# Patient Record
Sex: MALE | Race: WHITE | NOT HISPANIC OR LATINO | Employment: FULL TIME | URBAN - METROPOLITAN AREA
[De-identification: names, ages, dates, MRNs, and addresses within clinical notes are randomized per-mention and may not be internally consistent; named-entity substitution may affect disease eponyms.]

---

## 2017-02-12 ENCOUNTER — HOSPITAL ENCOUNTER (EMERGENCY)
Facility: HOSPITAL | Age: 37
Discharge: HOME | End: 2017-02-12
Attending: EMERGENCY MEDICINE | Admitting: EMERGENCY MEDICINE
Payer: COMMERCIAL

## 2017-02-12 ENCOUNTER — HOSPITAL ENCOUNTER (EMERGENCY)
Facility: HOSPITAL | Age: 37
Discharge: HOME | End: 2017-02-12
Attending: EMERGENCY MEDICINE
Payer: COMMERCIAL

## 2017-02-12 ENCOUNTER — APPOINTMENT (EMERGENCY)
Dept: RADIOLOGY | Facility: HOSPITAL | Age: 37
End: 2017-02-12
Payer: COMMERCIAL

## 2017-02-12 ENCOUNTER — APPOINTMENT (EMERGENCY)
Dept: CT IMAGING | Facility: HOSPITAL | Age: 37
End: 2017-02-12
Payer: COMMERCIAL

## 2017-02-12 VITALS
TEMPERATURE: 99.4 F | SYSTOLIC BLOOD PRESSURE: 130 MMHG | DIASTOLIC BLOOD PRESSURE: 77 MMHG | WEIGHT: 168.87 LBS | OXYGEN SATURATION: 98 % | RESPIRATION RATE: 16 BRPM | HEART RATE: 101 BPM

## 2017-02-12 VITALS
HEART RATE: 68 BPM | SYSTOLIC BLOOD PRESSURE: 139 MMHG | WEIGHT: 168.87 LBS | OXYGEN SATURATION: 98 % | DIASTOLIC BLOOD PRESSURE: 87 MMHG | RESPIRATION RATE: 16 BRPM | TEMPERATURE: 98 F

## 2017-02-12 DIAGNOSIS — M54.6 THORACIC BACK PAIN: Primary | ICD-10-CM

## 2017-02-12 DIAGNOSIS — M54.6 ACUTE THORACIC BACK PAIN: Primary | ICD-10-CM

## 2017-02-12 PROCEDURE — 99283 EMERGENCY DEPT VISIT LOW MDM: CPT

## 2017-02-12 PROCEDURE — 72070 X-RAY EXAM THORAC SPINE 2VWS: CPT

## 2017-02-12 PROCEDURE — 72128 CT CHEST SPINE W/O DYE: CPT

## 2017-02-12 RX ORDER — NAPROXEN 500 MG/1
500 TABLET ORAL 2 TIMES DAILY WITH MEALS
Qty: 20 TABLET | Refills: 0 | Status: SHIPPED | OUTPATIENT
Start: 2017-02-12 | End: 2017-03-14

## 2017-02-12 RX ORDER — METHOCARBAMOL 750 MG/1
750 TABLET, FILM COATED ORAL 3 TIMES DAILY
Qty: 15 TABLET | Refills: 0 | Status: SHIPPED | OUTPATIENT
Start: 2017-02-12 | End: 2017-02-17

## 2017-10-11 ENCOUNTER — HOSPITAL ENCOUNTER (EMERGENCY)
Facility: HOSPITAL | Age: 37
Discharge: HOME | End: 2017-10-11
Attending: EMERGENCY MEDICINE | Admitting: EMERGENCY MEDICINE
Payer: COMMERCIAL

## 2017-10-11 VITALS
TEMPERATURE: 98.1 F | RESPIRATION RATE: 18 BRPM | SYSTOLIC BLOOD PRESSURE: 141 MMHG | HEART RATE: 100 BPM | DIASTOLIC BLOOD PRESSURE: 87 MMHG | OXYGEN SATURATION: 100 %

## 2017-10-11 DIAGNOSIS — T16.2XXA FOREIGN BODY OF LEFT EAR, INITIAL ENCOUNTER: Primary | ICD-10-CM

## 2017-10-11 PROCEDURE — 99282 EMERGENCY DEPT VISIT SF MDM: CPT

## 2017-10-11 NOTE — ED PROVIDER NOTES
History  Chief Complaint   Patient presents with    Foreign Body in Ear     pt was cleaning out l/ear yesterday w/a plastic syringe, there was a black rubber piece on the tip of it;  pt was shooting up previously w/that syringe and did not come in yesterday b/c he was high; pt having L/ear pain @this time     52-year-old male presents with chief complaint of foreign body in his left ear  Patient had some irritation so he used a syringe that he usually shoots up with and pushed the plunger into his left ear  The rubber portion of the plunger remained in his ear  This occurred yesterday  Patient now with ear pressure  No drainage  No decreased hearing  History provided by:  Patient   used: No    Foreign Body in Ear   Location:  L ear  Suspected object: rubber portion of plunger  Pain quality:  Aching and throbbing  Pain severity:  Mild  Duration:  1 day  Timing:  Constant  Progression:  Unchanged  Chronicity:  New  Worsened by:  Nothing  Ineffective treatments:  None tried  Associated symptoms: ear pain    Associated symptoms: no hearing loss, no nausea and no vomiting        Prior to Admission Medications   Prescriptions Last Dose Informant Patient Reported? Taking? methocarbamol (ROBAXIN) 750 mg tablet   No No   Sig: Take 1 tablet by mouth 3 (three) times a day for 5 days   naproxen (NAPROSYN) 500 mg tablet   No No   Sig: Take 1 tablet by mouth 2 (two) times a day with meals for 30 days      Facility-Administered Medications: None       Past Medical History:   Diagnosis Date    Drug addiction ([facility] [address] )     Heroin abuse        History reviewed  No pertinent surgical history  History reviewed  No pertinent family history  I have reviewed and agree with the history as documented      Social History   Substance Use Topics    Smoking status: Current Every Day Smoker    Smokeless tobacco: Current User    Alcohol use No        Review of Systems   Constitutional: Negative for chills, diaphoresis and fever  HENT: Positive for ear pain  Negative for hearing loss  Respiratory: Negative for shortness of breath  Cardiovascular: Negative for chest pain and palpitations  Gastrointestinal: Negative for diarrhea, nausea and vomiting  Genitourinary: Negative for dysuria and frequency  Skin: Negative for rash  All other systems reviewed and are negative  Physical Exam  ED Triage Vitals [10/11/17 1445]   Temperature Pulse Respirations Blood Pressure SpO2   98 1 °F (36 7 °C) 100 18 141/87 100 %      Temp Source Heart Rate Source Patient Position - Orthostatic VS BP Location FiO2 (%)   Oral -- Sitting Left arm --      Pain Score       3           Physical Exam   Constitutional: He is oriented to person, place, and time  He appears well-developed and well-nourished  HENT:   Head: Normocephalic and atraumatic  Left Ear: A foreign body (small black rubber plunger piece from syringe) is present  Eyes: EOM are normal  Pupils are equal, round, and reactive to light  Cardiovascular: Normal rate, regular rhythm, normal heart sounds and intact distal pulses  Exam reveals no gallop and no friction rub  No murmur heard  Pulmonary/Chest: Effort normal and breath sounds normal  No respiratory distress  He has no wheezes  He has no rales  Musculoskeletal: Normal range of motion  He exhibits no tenderness or deformity  Neurological: He is alert and oriented to person, place, and time  Skin: Skin is warm and dry  Capillary refill takes less than 2 seconds  Psychiatric: He has a normal mood and affect  His behavior is normal  Judgment and thought content normal    Nursing note and vitals reviewed        ED Medications  Medications - No data to display    Diagnostic Studies  Labs Reviewed - No data to display    No orders to display       Procedures  Foreign Body - Orifice  Date/Time: 10/11/2017 4:01 PM  Performed by: Reuben Mcneill by: Nisha Montaño     Patient location:  ED  Consent:     Consent obtained:  Verbal    Consent given by:  Patient    Risks discussed:  Pain  Universal protocol:     Patient identity confirmed:  Verbally with patient  Location:     Location:  Ear    Ear location:  L ear  Procedure details:     Ear: operating otoscope  Removal mechanism:  Alligator forceps    Procedure complexity:  Simple    Foreign bodies recovered:  1    Description:  Rubber portion of plunger from syringe    Intact foreign body removal: yes    Post-procedure details:     Confirmation:  No additional foreign bodies on visualization    Patient tolerance of procedure: Tolerated well, no immediate complications          Phone Contacts  ED Phone Contact    ED Course  ED Course                                MDM  Number of Diagnoses or Management Options  Foreign body of left ear, initial encounter: new and does not require workup  Diagnosis management comments: Background: 40 y o  male presents with left ear foreign body      Plan: removal with operating otoscope and alligator forceps       Patient Progress  Patient progress: improved    CritCare Time    Disposition  Final diagnoses:   Foreign body of left ear, initial encounter     ED Disposition     ED Disposition Condition Comment    Discharge  Migel Win discharge to home/self care  Condition at discharge: Good        Follow-up Information    None       Discharge Medication List as of 10/11/2017  4:01 PM      CONTINUE these medications which have NOT CHANGED    Details   methocarbamol (ROBAXIN) 750 mg tablet Take 1 tablet by mouth 3 (three) times a day for 5 days, Starting 2/12/2017, Until Fri 2/17/17, Print      naproxen (NAPROSYN) 500 mg tablet Take 1 tablet by mouth 2 (two) times a day with meals for 30 days, Starting 2/12/2017, Until Tue 3/14/17, Print           No discharge procedures on file      ED Provider  Electronically Signed by       Debra Jimenez MD  10/11/17 8498

## 2017-10-11 NOTE — ED NOTES
Pt alert and awake, no distress noted, no other questions upon d/c     April CATHIE James RN  10/11/17 0188

## 2017-10-11 NOTE — DISCHARGE INSTRUCTIONS
Ear Foreign Body   WHAT YOU NEED TO KNOW:   An ear foreign body is an object that is stuck in your ear  Foreign bodies are usually trapped in the outer ear canal  This is the tube from the opening of your ear to your eardrum  DISCHARGE INSTRUCTIONS:   Medicines:   · Steroid cream:  This medicine helps decrease redness and swelling  · Antibiotics: This medicine is given to help treat or prevent an infection caused by bacteria  · Take your medicine as directed  Contact your healthcare provider if you think your medicine is not helping or if you have side effects  Tell him of her if you are allergic to any medicine  Keep a list of the medicines, vitamins, and herbs you take  Include the amounts, and when and why you take them  Bring the list or the pill bottles to follow-up visits  Carry your medicine list with you in case of an emergency  Follow up with your healthcare provider or otolaryngologist as directed:  Write down your questions so you remember to ask them during your visits  Contact your healthcare provider or otolaryngologist if:   · You have a fever  · You have trouble hearing, or you hear ringing  · You have questions or concerns about your condition or care  Return to the emergency department if:   · You have severe ear pain  · You have pus or blood draining from your ear  © 2017 [employer] Information is for End User's use only and may not be sold, redistributed or otherwise used for commercial purposes  All illustrations and images included in CareNotes® are the copyrighted property of [employer]  or [employer]  The above information is an  only  It is not intended as medical advice for individual conditions or treatments  Talk to your doctor, nurse or pharmacist before following any medical regimen to see if it is safe and effective for you

## 2025-08-02 ENCOUNTER — APPOINTMENT (EMERGENCY)
Dept: RADIOLOGY | Facility: HOSPITAL | Age: 45
End: 2025-08-02
Payer: MEDICARE

## 2025-08-02 ENCOUNTER — APPOINTMENT (EMERGENCY)
Dept: CT IMAGING | Facility: HOSPITAL | Age: 45
End: 2025-08-02
Payer: MEDICARE

## 2025-08-02 ENCOUNTER — HOSPITAL ENCOUNTER (EMERGENCY)
Facility: HOSPITAL | Age: 45
End: 2025-08-02
Admitting: EMERGENCY MEDICINE
Payer: MEDICARE

## 2025-08-02 VITALS
RESPIRATION RATE: 16 BRPM | OXYGEN SATURATION: 100 % | DIASTOLIC BLOOD PRESSURE: 101 MMHG | WEIGHT: 238.1 LBS | TEMPERATURE: 97.5 F | SYSTOLIC BLOOD PRESSURE: 175 MMHG | HEART RATE: 101 BPM

## 2025-08-02 DIAGNOSIS — J96.90 RESPIRATORY FAILURE (HCC): ICD-10-CM

## 2025-08-02 DIAGNOSIS — R41.82 ALTERED MENTAL STATUS: ICD-10-CM

## 2025-08-02 DIAGNOSIS — I60.9 SUBARACHNOID HEMORRHAGE (HCC): ICD-10-CM

## 2025-08-02 DIAGNOSIS — I61.5 INTRAVENTRICULAR HEMORRHAGE (HCC): Primary | ICD-10-CM

## 2025-08-02 DIAGNOSIS — R11.10 VOMITING: ICD-10-CM

## 2025-08-02 DIAGNOSIS — I10 HYPERTENSION: ICD-10-CM

## 2025-08-02 DIAGNOSIS — D72.829 LEUKOCYTOSIS: ICD-10-CM

## 2025-08-02 PROBLEM — G91.1 OBSTRUCTIVE HYDROCEPHALUS (HCC): Status: ACTIVE | Noted: 2025-08-02

## 2025-08-02 PROBLEM — G93.6 CEREBRAL EDEMA (HCC): Status: ACTIVE | Noted: 2025-08-02

## 2025-08-02 LAB
ALBUMIN SERPL BCG-MCNC: 5 G/DL (ref 3.5–5)
ALP SERPL-CCNC: 109 U/L (ref 34–104)
ALT SERPL W P-5'-P-CCNC: 30 U/L (ref 7–52)
ANION GAP SERPL CALCULATED.3IONS-SCNC: 14 MMOL/L (ref 4–13)
AST SERPL W P-5'-P-CCNC: 25 U/L (ref 13–39)
BASOPHILS # BLD AUTO: 0.06 THOUSANDS/ÂΜL (ref 0–0.1)
BASOPHILS NFR BLD AUTO: 0 % (ref 0–1)
BILIRUB SERPL-MCNC: 0.64 MG/DL (ref 0.2–1)
BUN SERPL-MCNC: 16 MG/DL (ref 5–25)
CALCIUM SERPL-MCNC: 9.5 MG/DL (ref 8.4–10.2)
CARDIAC TROPONIN I PNL SERPL HS: 3 NG/L (ref ?–50)
CHLORIDE SERPL-SCNC: 102 MMOL/L (ref 96–108)
CO2 SERPL-SCNC: 25 MMOL/L (ref 21–32)
CREAT SERPL-MCNC: 1 MG/DL (ref 0.6–1.3)
EOSINOPHIL # BLD AUTO: 0.01 THOUSAND/ÂΜL (ref 0–0.61)
EOSINOPHIL NFR BLD AUTO: 0 % (ref 0–6)
ERYTHROCYTE [DISTWIDTH] IN BLOOD BY AUTOMATED COUNT: 14 % (ref 11.6–15.1)
ETHANOL SERPL-MCNC: <10 MG/DL
GFR SERPL CREATININE-BSD FRML MDRD: 43 ML/MIN/1.73SQ M
GLUCOSE SERPL-MCNC: 123 MG/DL (ref 65–140)
HCT VFR BLD AUTO: 44.4 % (ref 36.5–49.3)
HGB BLD-MCNC: 15.6 G/DL (ref 12–17)
IMM GRANULOCYTES # BLD AUTO: 0.07 THOUSAND/UL (ref 0–0.2)
IMM GRANULOCYTES NFR BLD AUTO: 0 % (ref 0–2)
LYMPHOCYTES # BLD AUTO: 1.59 THOUSANDS/ÂΜL (ref 0.6–4.47)
LYMPHOCYTES NFR BLD AUTO: 10 % (ref 14–44)
MCH RBC QN AUTO: 32.9 PG (ref 26.8–34.3)
MCHC RBC AUTO-ENTMCNC: 35.1 G/DL (ref 31.4–37.4)
MCV RBC AUTO: 94 FL (ref 82–98)
MONOCYTES # BLD AUTO: 0.72 THOUSAND/ÂΜL (ref 0.17–1.22)
MONOCYTES NFR BLD AUTO: 5 % (ref 4–12)
NEUTROPHILS # BLD AUTO: 13.2 THOUSANDS/ÂΜL (ref 1.85–7.62)
NEUTS SEG NFR BLD AUTO: 85 % (ref 43–75)
NRBC BLD AUTO-RTO: 0 /100 WBCS
PLATELET # BLD AUTO: 269 THOUSANDS/UL (ref 149–390)
PMV BLD AUTO: 9 FL (ref 8.9–12.7)
POTASSIUM SERPL-SCNC: 3.2 MMOL/L (ref 3.5–5.3)
PROT SERPL-MCNC: 8.5 G/DL (ref 6.4–8.4)
RBC # BLD AUTO: 4.74 MILLION/UL (ref 3.88–5.62)
SODIUM SERPL-SCNC: 141 MMOL/L (ref 135–147)
TSH SERPL DL<=0.05 MIU/L-ACNC: 0.79 UIU/ML (ref 0.45–4.5)
WBC # BLD AUTO: 15.65 THOUSAND/UL (ref 4.31–10.16)

## 2025-08-02 PROCEDURE — 94002 VENT MGMT INPAT INIT DAY: CPT

## 2025-08-02 PROCEDURE — 99285 EMERGENCY DEPT VISIT HI MDM: CPT

## 2025-08-02 PROCEDURE — 84443 ASSAY THYROID STIM HORMONE: CPT | Performed by: EMERGENCY MEDICINE

## 2025-08-02 PROCEDURE — 96365 THER/PROPH/DIAG IV INF INIT: CPT

## 2025-08-02 PROCEDURE — 36556 INSERT NON-TUNNEL CV CATH: CPT | Performed by: EMERGENCY MEDICINE

## 2025-08-02 PROCEDURE — 31500 INSERT EMERGENCY AIRWAY: CPT | Performed by: EMERGENCY MEDICINE

## 2025-08-02 PROCEDURE — 36415 COLL VENOUS BLD VENIPUNCTURE: CPT | Performed by: EMERGENCY MEDICINE

## 2025-08-02 PROCEDURE — 96375 TX/PRO/DX INJ NEW DRUG ADDON: CPT

## 2025-08-02 PROCEDURE — 96368 THER/DIAG CONCURRENT INF: CPT

## 2025-08-02 PROCEDURE — 71045 X-RAY EXAM CHEST 1 VIEW: CPT

## 2025-08-02 PROCEDURE — 70496 CT ANGIOGRAPHY HEAD: CPT

## 2025-08-02 PROCEDURE — 70498 CT ANGIOGRAPHY NECK: CPT

## 2025-08-02 PROCEDURE — 80053 COMPREHEN METABOLIC PANEL: CPT | Performed by: EMERGENCY MEDICINE

## 2025-08-02 PROCEDURE — 70450 CT HEAD/BRAIN W/O DYE: CPT

## 2025-08-02 PROCEDURE — 99291 CRITICAL CARE FIRST HOUR: CPT | Performed by: EMERGENCY MEDICINE

## 2025-08-02 PROCEDURE — 85025 COMPLETE CBC W/AUTO DIFF WBC: CPT | Performed by: EMERGENCY MEDICINE

## 2025-08-02 PROCEDURE — 82077 ASSAY SPEC XCP UR&BREATH IA: CPT | Performed by: EMERGENCY MEDICINE

## 2025-08-02 PROCEDURE — 84484 ASSAY OF TROPONIN QUANT: CPT | Performed by: EMERGENCY MEDICINE

## 2025-08-02 PROCEDURE — 94760 N-INVAS EAR/PLS OXIMETRY 1: CPT

## 2025-08-02 RX ORDER — ETOMIDATE 2 MG/ML
20 INJECTION INTRAVENOUS ONCE
Status: COMPLETED | OUTPATIENT
Start: 2025-08-02 | End: 2025-08-02

## 2025-08-02 RX ORDER — FENTANYL CITRATE 50 UG/ML
INJECTION, SOLUTION INTRAMUSCULAR; INTRAVENOUS
Status: COMPLETED
Start: 2025-08-02 | End: 2025-08-02

## 2025-08-02 RX ORDER — MIDAZOLAM HYDROCHLORIDE 2 MG/2ML
2 INJECTION, SOLUTION INTRAMUSCULAR; INTRAVENOUS ONCE
Status: COMPLETED | OUTPATIENT
Start: 2025-08-02 | End: 2025-08-02

## 2025-08-02 RX ORDER — SUCCINYLCHOLINE/SOD CL,ISO/PF 100 MG/5ML
100 SYRINGE (ML) INTRAVENOUS ONCE
Status: COMPLETED | OUTPATIENT
Start: 2025-08-02 | End: 2025-08-02

## 2025-08-02 RX ORDER — MANNITOL 20 G/100ML
1 INJECTION, SOLUTION INTRAVENOUS ONCE
Status: COMPLETED | OUTPATIENT
Start: 2025-08-02 | End: 2025-08-02

## 2025-08-02 RX ORDER — WATER 10 ML/10ML
INJECTION INTRAMUSCULAR; INTRAVENOUS; SUBCUTANEOUS
Status: COMPLETED
Start: 2025-08-02 | End: 2025-08-02

## 2025-08-02 RX ORDER — NALOXONE HYDROCHLORIDE 0.4 MG/ML
0.4 INJECTION, SOLUTION INTRAMUSCULAR; INTRAVENOUS; SUBCUTANEOUS ONCE
Status: COMPLETED | OUTPATIENT
Start: 2025-08-02 | End: 2025-08-02

## 2025-08-02 RX ORDER — PROPOFOL 10 MG/ML
20 INJECTION, EMULSION INTRAVENOUS
Status: DISCONTINUED | OUTPATIENT
Start: 2025-08-02 | End: 2025-08-02 | Stop reason: HOSPADM

## 2025-08-02 RX ORDER — 3% SODIUM CHLORIDE 3 G/100ML
30 INJECTION, SOLUTION INTRAVENOUS CONTINUOUS
Status: DISCONTINUED | OUTPATIENT
Start: 2025-08-02 | End: 2025-08-02

## 2025-08-02 RX ORDER — SODIUM CHLORIDE 3 G/100ML
250 INJECTION, SOLUTION INTRAVENOUS ONCE
Status: COMPLETED | OUTPATIENT
Start: 2025-08-02 | End: 2025-08-02

## 2025-08-02 RX ORDER — LEVETIRACETAM 500 MG/5ML
4000 INJECTION, SOLUTION, CONCENTRATE INTRAVENOUS ONCE
Status: COMPLETED | OUTPATIENT
Start: 2025-08-02 | End: 2025-08-02

## 2025-08-02 RX ORDER — FENTANYL CITRATE 50 UG/ML
100 INJECTION, SOLUTION INTRAMUSCULAR; INTRAVENOUS ONCE
Refills: 0 | Status: COMPLETED | OUTPATIENT
Start: 2025-08-02 | End: 2025-08-02

## 2025-08-02 RX ORDER — VECURONIUM BROMIDE 1 MG/ML
10 INJECTION, POWDER, LYOPHILIZED, FOR SOLUTION INTRAVENOUS ONCE
Status: COMPLETED | OUTPATIENT
Start: 2025-08-02 | End: 2025-08-02

## 2025-08-02 RX ADMIN — PROPOFOL 20 MCG/KG/MIN: 10 INJECTION, EMULSION INTRAVENOUS at 16:03

## 2025-08-02 RX ADMIN — Medication 100 MG: at 15:54

## 2025-08-02 RX ADMIN — ETOMIDATE 20 MG: 20 INJECTION, SOLUTION INTRAVENOUS at 15:53

## 2025-08-02 RX ADMIN — SODIUM CHLORIDE 5 MG/HR: 0.9 INJECTION, SOLUTION INTRAVENOUS at 16:24

## 2025-08-02 RX ADMIN — NALOXONE HYDROCHLORIDE 0.4 MG: 0.4 INJECTION, SOLUTION INTRAMUSCULAR; INTRAVENOUS; SUBCUTANEOUS at 15:10

## 2025-08-02 RX ADMIN — FENTANYL CITRATE 100 MCG: 50 INJECTION INTRAMUSCULAR; INTRAVENOUS at 17:21

## 2025-08-02 RX ADMIN — SODIUM CHLORIDE 30 ML/HR: 3 INJECTION, SOLUTION INTRAVENOUS at 17:04

## 2025-08-02 RX ADMIN — IOHEXOL 85 ML: 350 INJECTION, SOLUTION INTRAVENOUS at 16:49

## 2025-08-02 RX ADMIN — MANNITOL 100 G: 20 INJECTION, SOLUTION INTRAVENOUS at 17:17

## 2025-08-02 RX ADMIN — VECURONIUM BROMIDE 10 MG: 10 INJECTION, POWDER, LYOPHILIZED, FOR SOLUTION INTRAVENOUS at 16:23

## 2025-08-02 RX ADMIN — MIDAZOLAM 2 MG: 1 INJECTION INTRAMUSCULAR; INTRAVENOUS at 15:26

## 2025-08-02 RX ADMIN — SODIUM CHLORIDE 250 ML: 3 INJECTION, SOLUTION INTRAVENOUS at 16:29

## 2025-08-02 RX ADMIN — LEVETIRACETAM 4000 MG: 500 INJECTION, SOLUTION, CONCENTRATE INTRAVENOUS at 17:04

## 2025-08-02 RX ADMIN — FENTANYL CITRATE 100 MCG: 50 INJECTION, SOLUTION INTRAMUSCULAR; INTRAVENOUS at 17:21

## 2025-08-02 RX ADMIN — PROPOFOL 20 MCG/KG/MIN: 10 INJECTION, EMULSION INTRAVENOUS at 17:05

## 2025-08-04 PROBLEM — R56.9 SEIZURES (HCC): Status: ACTIVE | Noted: 2025-08-04

## 2025-08-06 PROBLEM — Z51.5 PALLIATIVE CARE ENCOUNTER: Status: ACTIVE | Noted: 2025-08-06

## 2025-08-18 ENCOUNTER — TELEPHONE (OUTPATIENT)
Dept: NEUROSURGERY | Facility: CLINIC | Age: 45
End: 2025-08-18

## 2025-08-20 PROBLEM — G93.40 ENCEPHALOPATHY: Status: ACTIVE | Noted: 2025-08-02

## 2025-08-20 PROBLEM — I10 PRIMARY HYPERTENSION: Status: ACTIVE | Noted: 2025-08-20

## 2025-08-20 PROBLEM — R13.10 DYSPHAGIA: Status: ACTIVE | Noted: 2025-08-20

## 2025-08-20 PROBLEM — Z71.89 GOALS OF CARE, COUNSELING/DISCUSSION: Status: ACTIVE | Noted: 2025-08-20

## 2025-08-22 ENCOUNTER — DOCUMENTATION (OUTPATIENT)
Dept: NEUROLOGY | Facility: CLINIC | Age: 45
End: 2025-08-22